# Patient Record
Sex: FEMALE | Race: ASIAN | Employment: FULL TIME | URBAN - METROPOLITAN AREA
[De-identification: names, ages, dates, MRNs, and addresses within clinical notes are randomized per-mention and may not be internally consistent; named-entity substitution may affect disease eponyms.]

---

## 2024-01-25 ENCOUNTER — OFFICE VISIT (OUTPATIENT)
Age: 39
End: 2024-01-25

## 2024-01-25 VITALS
SYSTOLIC BLOOD PRESSURE: 122 MMHG | HEART RATE: 65 BPM | RESPIRATION RATE: 18 BRPM | HEIGHT: 64 IN | DIASTOLIC BLOOD PRESSURE: 77 MMHG | TEMPERATURE: 98.6 F | BODY MASS INDEX: 28.17 KG/M2 | WEIGHT: 165 LBS | OXYGEN SATURATION: 99 %

## 2024-01-25 DIAGNOSIS — J00 ACUTE NASOPHARYNGITIS: Primary | ICD-10-CM

## 2024-01-25 DIAGNOSIS — H69.93 DYSFUNCTION OF BOTH EUSTACHIAN TUBES: ICD-10-CM

## 2024-01-25 RX ORDER — MULTIVIT-MIN/IRON/FOLIC ACID/K 18-600-40
1 CAPSULE ORAL DAILY
COMMUNITY

## 2024-01-25 RX ORDER — PSEUDOEPHEDRINE HCL 30 MG
30 TABLET ORAL EVERY 4 HOURS PRN
Refills: 1 | COMMUNITY
Start: 2024-01-25 | End: 2024-02-01

## 2024-01-25 RX ORDER — FLUTICASONE PROPIONATE 50 MCG
2 SPRAY, SUSPENSION (ML) NASAL DAILY
Qty: 16 G | Refills: 0 | Status: SHIPPED | OUTPATIENT
Start: 2024-01-25

## 2024-01-25 ASSESSMENT — ENCOUNTER SYMPTOMS
FACIAL SWELLING: 0
SINUS PRESSURE: 1
STRIDOR: 0
SORE THROAT: 0
SHORTNESS OF BREATH: 0
COUGH: 1
WHEEZING: 0

## 2024-01-25 NOTE — PATIENT INSTRUCTIONS
You have been diagnosed with an Upper respiratory viral infection. It is important to monitor your fever and take Tylenol and/or ibuprofen to keep your fever down and reduce body aches. For nasal congestion, Flonase nasal spray may be used for nasal stuffiness. To dry up nasal secretions you may use Sudafed, if you do not have high blood pressure. For people with hypertension, use Coricidin HBP. Nasal saline spray may also be beneficial and a cool mist humidifier.     It is also important to maintain good hydration, drink at least 64 ounces of fluid, water, juice, gingerale and gatorade are good choices. Avoid drinks with caffeine as they do not hydrate. Monitor for good urine output.     If fever persists, you develop abdominal pain, vomiting or shortness of breath, or do not  improve, please call PCP or go to nearest ED.     Your fever usually resolves in 48 to 72 hours. Other symptoms, such as cough and nasal stuffiness usually resolve in 7 to 10 days, but may last longer.    Thank you for coming in to the [facility] today. I hope you are feeling better soon!

## 2024-01-25 NOTE — PROGRESS NOTES
Yuliya Yan (:  1985) is a 38 y.o. female,New patient, here for evaluation of the following chief complaint(s):  Cough (Patient has headache, congestion, and cough for 4 weeks. Cough has been on going for over 4 weeks. Headache started 4 days ago.)        Assessment    1. Acute nasopharyngitis  2. Dysfunction of both eustachian tubes     Plan    Discussed with patient I believe she has symptoms of an Upper respiratory virus, likely the common cold. Symptoms have lingered but currently she is afebrile and has left frontal headache with pressure but no other symptoms and negative exam to suggest sinusitis. Although the common cold is usually diagnosed clinically and readily identified by symptoms, several other conditions may mimic the common cold: Allergic or seasonal rhinitis, Bacterial pharyngitis or tonsillitis, Acute bacterial rhinosinusitis, Influenza, and Pertussis.  The common cold can typically be differentiated from simple rhinitis, by the presence of sore throat and cough, and from bacterial tonsillitis, by the presence of prominent rhinorrhea and nasal stuffiness. Patients with acute rhinosinusitis typically experience facial pain in conjunction with purulent nasal discharge, while patients with influenza typically have a high fever, headache, and myalgias. Pertussis in particular has experienced a marked resurgence and may begin with symptoms similar to the common cold. However, pertussis is also associated with prolonged coughing, typically paroxysmal, and with vomiting and sometimes apnea. Pertussis may present atypically, particularly in previously immunized adults, and the persistence of severe coughing for more than two weeks is suggestive of pertussis     She has some mild eustachian tube dysfunction, more on the right,  and I have suggested Flonase and Sudafed to help with symptoms. If not improving in 5 days of using or if symptoms progress she may return for further evaluation as